# Patient Record
Sex: FEMALE | Race: ASIAN | NOT HISPANIC OR LATINO
[De-identification: names, ages, dates, MRNs, and addresses within clinical notes are randomized per-mention and may not be internally consistent; named-entity substitution may affect disease eponyms.]

---
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
## 2023-06-09 ENCOUNTER — APPOINTMENT (OUTPATIENT)
Dept: URBAN - METROPOLITAN AREA CLINIC 208 | Age: 18
Setting detail: DERMATOLOGY
End: 2023-06-09

## 2023-06-09 DIAGNOSIS — L28.1 PRURIGO NODULARIS: ICD-10-CM

## 2023-06-09 DIAGNOSIS — L40.0 PSORIASIS VULGARIS: ICD-10-CM

## 2023-06-09 PROCEDURE — 99214 OFFICE O/P EST MOD 30 MIN: CPT

## 2023-06-09 PROCEDURE — OTHER MIPS QUALITY: OTHER

## 2023-06-09 PROCEDURE — OTHER COUNSELING: TOPICAL STEROIDS: OTHER

## 2023-06-09 PROCEDURE — OTHER COUNSELING: OTHER

## 2023-06-09 PROCEDURE — OTHER PRESCRIPTION: OTHER

## 2023-06-09 PROCEDURE — OTHER ADDITIONAL NOTES: OTHER

## 2023-06-09 RX ORDER — CLOBETASOL PROPIONATE 0.5 MG/G
OINTMENT TOPICAL
Qty: 60 | Refills: 3 | Status: ERX | COMMUNITY
Start: 2023-06-09

## 2023-06-09 RX ORDER — CALCIPOTRIENE 50 UG/G
OINTMENT TOPICAL
Qty: 120 | Refills: 3 | Status: ERX | COMMUNITY
Start: 2023-06-09

## 2023-06-09 ASSESSMENT — LOCATION DETAILED DESCRIPTION DERM
LOCATION DETAILED: RIGHT DISTAL CALF
LOCATION DETAILED: LEFT POPLITEAL SKIN
LOCATION DETAILED: RIGHT PROXIMAL PRETIBIAL REGION
LOCATION DETAILED: LEFT PROXIMAL PRETIBIAL REGION
LOCATION DETAILED: LEFT DISTAL PRETIBIAL REGION
LOCATION DETAILED: LEFT POSTERIOR ANKLE
LOCATION DETAILED: RIGHT DISTAL PRETIBIAL REGION
LOCATION DETAILED: LEFT DISTAL LATERAL CALF
LOCATION DETAILED: RIGHT POPLITEAL SKIN

## 2023-06-09 ASSESSMENT — LOCATION SIMPLE DESCRIPTION DERM
LOCATION SIMPLE: LEFT ANKLE
LOCATION SIMPLE: LEFT POPLITEAL SKIN
LOCATION SIMPLE: RIGHT POPLITEAL SKIN
LOCATION SIMPLE: RIGHT PRETIBIAL REGION
LOCATION SIMPLE: LEFT PRETIBIAL REGION
LOCATION SIMPLE: LEFT CALF
LOCATION SIMPLE: RIGHT CALF

## 2023-06-09 ASSESSMENT — LOCATION ZONE DERM: LOCATION ZONE: LEG

## 2023-06-09 NOTE — HPI: PSORIASIS (PATIENT REPORTED)
Have You Been Diagnosed With Psoriatic Arthritis?: no
Where Is Your Psoriasis Located?: Legs
List Prescription Topical Steroids You Tried (Separate Each Name With A Comma):: Clobetasol

## 2023-06-09 NOTE — PROCEDURE: ADDITIONAL NOTES
Additional Notes: Discussed fidget spinner and cutting nails low \\nDiscussed duoderm patches
Detail Level: Generalized
Render Risk Assessment In Note?: no

## 2023-06-09 NOTE — PROCEDURE: MIPS QUALITY
Detail Level: Detailed
Quality 402: Tobacco Use And Help With Quitting Among Adolescents: Patient screened for tobacco and never smoked
Quality 485: Psoriasis - Improvement In Patient-Reported Itch Severity: Itch severity assessment score is reduced by 2 or more points from the initial (index) assessment score to the follow-up visit score

## 2023-07-07 ENCOUNTER — APPOINTMENT (OUTPATIENT)
Dept: URBAN - METROPOLITAN AREA CLINIC 208 | Age: 18
Setting detail: DERMATOLOGY
End: 2023-07-11

## 2023-07-07 DIAGNOSIS — L28.1 PRURIGO NODULARIS: ICD-10-CM

## 2023-07-07 DIAGNOSIS — L81.0 POSTINFLAMMATORY HYPERPIGMENTATION: ICD-10-CM

## 2023-07-07 DIAGNOSIS — L20.89 OTHER ATOPIC DERMATITIS: ICD-10-CM

## 2023-07-07 PROBLEM — L20.84 INTRINSIC (ALLERGIC) ECZEMA: Status: ACTIVE | Noted: 2023-07-07

## 2023-07-07 PROCEDURE — OTHER ADDITIONAL NOTES: OTHER

## 2023-07-07 PROCEDURE — OTHER COUNSELING: OTHER

## 2023-07-07 PROCEDURE — OTHER MIPS QUALITY: OTHER

## 2023-07-07 PROCEDURE — OTHER OTC TREATMENT REGIMEN: OTHER

## 2023-07-07 PROCEDURE — 99214 OFFICE O/P EST MOD 30 MIN: CPT

## 2023-07-07 PROCEDURE — OTHER PRESCRIPTION MEDICATION MANAGEMENT: OTHER

## 2023-07-07 ASSESSMENT — LOCATION SIMPLE DESCRIPTION DERM
LOCATION SIMPLE: LEFT PRETIBIAL REGION
LOCATION SIMPLE: LEFT CALF
LOCATION SIMPLE: RIGHT CALF
LOCATION SIMPLE: RIGHT POPLITEAL SKIN
LOCATION SIMPLE: RIGHT ANKLE
LOCATION SIMPLE: LEFT HAND
LOCATION SIMPLE: RIGHT HAND
LOCATION SIMPLE: LEFT POPLITEAL SKIN
LOCATION SIMPLE: RIGHT PRETIBIAL REGION
LOCATION SIMPLE: LEFT ANKLE

## 2023-07-07 ASSESSMENT — LOCATION DETAILED DESCRIPTION DERM
LOCATION DETAILED: LEFT POPLITEAL SKIN
LOCATION DETAILED: LEFT DISTAL PRETIBIAL REGION
LOCATION DETAILED: RIGHT DISTAL PRETIBIAL REGION
LOCATION DETAILED: LEFT POSTERIOR ANKLE
LOCATION DETAILED: RIGHT POPLITEAL SKIN
LOCATION DETAILED: LEFT DISTAL CALF
LOCATION DETAILED: RIGHT RADIAL PALM
LOCATION DETAILED: LEFT THENAR EMINENCE
LOCATION DETAILED: RIGHT DISTAL CALF
LOCATION DETAILED: RIGHT POSTERIOR ANKLE

## 2023-07-07 ASSESSMENT — LOCATION ZONE DERM
LOCATION ZONE: HAND
LOCATION ZONE: LEG

## 2023-07-07 NOTE — PROCEDURE: COUNSELING
Bleach Bath Recommendations: Products containing hypochlorous acid can help reduce bacterial colonization of skin.\\nHere are examples of some products that contain hypochlorous acid:\\n\\nSkinSmart Eczema Therapy Spray:\\nhttps://www.amazon.com/SkinSmart-ANTIMICROBIAL-oz-Clear-Hypochlorous-ingredients/dp/K44G3L0CG9\\n\\nVetricyn - it's the same product, marketed to pets, so it's in a larger size and cheaper:\\nhttps://www.amazon.com/Vetericyn-Plus-Animal-Wound-Spray/dp/M84532TEMF\\n\\nWash/shampoo:\\nCLN body wash or CLN shampoo\\n\\n*****************************************************\\n\\nBLEACH BATH INSTRUCTIONS:\\n\\n1. Use regular strength – 6 percent – bleach for the bath. Do not use concentrated bleach.\\n2. Use a measuring cup or measuring spoon to add the bleach to the bath. Adding too much bleach to the bath can irritate your children’s skin. Adding too little bleach may not help.\\n3. Measure the amount of bleach before adding it to the bath water. For a full bathtub of water, use 1/2 cup of bleach. For a half-full tub of water, add 1/4 cup of bleach. For a baby or toddler bathtub, add one teaspoon of bleach per gallon of water.\\n4. Never apply bleach directly to your child’s eczema. While the tub is filling, pour the bleach into the water. Be sure to wait until the bath is fully drawn and bleach is poured before your child enters the tub.\\n5. Soak for 5-10 minutes, then rinse well (without soap) to avoid bleaching bath mats, carpets\\n6. Pat skin dry after the bath. If you use eczema medication, apply it immediately after the bath. Then moisturize your skin. Bleach Bath Recommendations: Products containing hypochlorous acid can help reduce bacterial colonization of skin.\\nHere are examples of some products that contain hypochlorous acid:\\n\\nSkinSmart Eczema Therapy Spray:\\nhttps://www.amazon.com/SkinSmart-ANTIMICROBIAL-oz-Clear-Hypochlorous-ingredients/dp/D77H8H5IW4\\n\\nVetricyn - it's the same product, marketed to pets, so it's in a larger size and cheaper:\\nhttps://www.amazon.com/Vetericyn-Plus-Animal-Wound-Spray/dp/B08292BPLV\\n\\nWash/shampoo:\\nCLN body wash or CLN shampoo\\n\\n*****************************************************\\n\\nBLEACH BATH INSTRUCTIONS:\\n\\n1. Use regular strength – 6 percent – bleach for the bath. Do not use concentrated bleach.\\n2. Use a measuring cup or measuring spoon to add the bleach to the bath. Adding too much bleach to the bath can irritate your children’s skin. Adding too little bleach may not help.\\n3. Measure the amount of bleach before adding it to the bath water. For a full bathtub of water, use 1/2 cup of bleach. For a half-full tub of water, add 1/4 cup of bleach. For a baby or toddler bathtub, add one teaspoon of bleach per gallon of water.\\n4. Never apply bleach directly to your child’s eczema. While the tub is filling, pour the bleach into the water. Be sure to wait until the bath is fully drawn and bleach is poured before your child enters the tub.\\n5. Soak for 5-10 minutes, then rinse well (without soap) to avoid bleaching bath mats, carpets\\n6. Pat skin dry after the bath. If you use eczema medication, apply it immediately after the bath. Then moisturize your skin.

## 2023-07-07 NOTE — PROCEDURE: OTC TREATMENT REGIMEN
Detail Level: Zone
Patient Specific Otc Recommendations (Will Not Stick From Patient To Patient): Dr. Smith recommends applying silicone scar tape to scars.

## 2023-07-07 NOTE — HPI: RASH (PSORIASIS)
How Severe Is Your Psoriasis?: moderate
Is This A New Presentation, Or A Follow-Up?: Follow Up Psoriasis
Additional History: Clobetasol QHS

## 2023-07-07 NOTE — PROCEDURE: ADDITIONAL NOTES
Additional Notes: To itchy spots: apply Clobetasol and cover with scar tape.
Render Risk Assessment In Note?: no
Detail Level: Generalized

## 2023-07-07 NOTE — PROCEDURE: PRESCRIPTION MEDICATION MANAGEMENT
Detail Level: Zone
Initiate Treatment: For flares: Apply Clobetasol ointment to dry, flakey patches twice daily for 2 weeks. Discontinue for 2 weeks prior to restarting.\\nFor maintenance: Apply Calcipotriene to eczema patches twice daily.
Render In Strict Bullet Format?: No